# Patient Record
Sex: MALE | Race: WHITE | NOT HISPANIC OR LATINO | Employment: FULL TIME | URBAN - METROPOLITAN AREA
[De-identification: names, ages, dates, MRNs, and addresses within clinical notes are randomized per-mention and may not be internally consistent; named-entity substitution may affect disease eponyms.]

---

## 2023-03-08 ENCOUNTER — TELEMEDICINE (OUTPATIENT)
Dept: FAMILY MEDICINE CLINIC | Facility: CLINIC | Age: 58
End: 2023-03-08

## 2023-03-08 VITALS — WEIGHT: 155 LBS | BODY MASS INDEX: 25.83 KG/M2 | HEIGHT: 65 IN

## 2023-03-08 DIAGNOSIS — Z71.9 ENCOUNTER FOR CONSULTATION: Primary | ICD-10-CM

## 2023-03-08 NOTE — PROGRESS NOTES
Virtual Brief Visit    Patient is located in the following state in which I hold an active license [place]      Assessment/Plan:    Problem List Items Addressed This Visit    None  Visit Diagnoses     Encounter for consultation    -  Primary      Patient has several questions regarding application for [address] medical as related to his son  His son is currently a college senior and interested in starting [address]  He is healthy  He wrestled in high school and now plays rugby in college  He is ahead of several clubs including the [employer] club  He is doing well at a private college  He was born in 2021 and 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  [facility]   He did receive some chemo  He had a complete recovery  He sees his oncologist once a year for follow-up but he has never had any recurrence and he has been fine for 20 years  He has no limitations  I discussed the process of application for an [address]   I recommended he get a letter from his treating oncologist so we can included with the application  I asked that when he is ready he should follow-up with me and we will complete the physical part of the application  We spent more than 30 minutes on the telephone regarding options  And he will call me with any questions as they move forward for his son's application  Recent Visits  No visits were found meeting these conditions  Showing recent visits within past 7 days and meeting all other requirements  Today's Visits  Date Type Provider Dept   03/08/23 Telemedicine Shayy Dumont DO [address] [facility] today's visits and meeting all other requirements  Future Appointments  No visits were found meeting these conditions    Showing future appointments within next 150 days and meeting all other requirements         Visit Time    Visit Start Time: 0845  Visit Stop Time: 5790  Total Visit Duration: 33 minutes